# Patient Record
Sex: FEMALE | Race: WHITE | NOT HISPANIC OR LATINO | URBAN - METROPOLITAN AREA
[De-identification: names, ages, dates, MRNs, and addresses within clinical notes are randomized per-mention and may not be internally consistent; named-entity substitution may affect disease eponyms.]

---

## 2024-03-22 ENCOUNTER — OFFICE VISIT (OUTPATIENT)
Dept: URGENT CARE | Age: 34
End: 2024-03-22
Payer: COMMERCIAL

## 2024-03-22 VITALS
OXYGEN SATURATION: 98 % | TEMPERATURE: 97.8 F | DIASTOLIC BLOOD PRESSURE: 68 MMHG | HEART RATE: 73 BPM | RESPIRATION RATE: 18 BRPM | SYSTOLIC BLOOD PRESSURE: 112 MMHG

## 2024-03-22 DIAGNOSIS — L23.7 POISON IVY: Primary | ICD-10-CM

## 2024-03-22 PROCEDURE — S9088 SERVICES PROVIDED IN URGENT: HCPCS

## 2024-03-22 PROCEDURE — 99213 OFFICE O/P EST LOW 20 MIN: CPT

## 2024-03-22 RX ORDER — PREDNISONE 10 MG/1
TABLET ORAL DAILY
Qty: 42 TABLET | Refills: 0 | Status: SHIPPED | OUTPATIENT
Start: 2024-03-22 | End: 2024-04-03

## 2024-03-22 NOTE — PROGRESS NOTES
[facility]        NAME: Rebeka Snell is a 33 y.o. female  : 1990    MRN: [id number]  DATE: 2024  TIME: 12:28 PM      Assessment and Plan     Poison ivy [L23.7]  1. Poison ivy  predniSONE 10 mg tablet            Patient Instructions   Take antibiotic as prescribed. Recommend probiotic use while taking antibiotic.   Benadryl OTC as needed.   Follow-up with PCP in 3-5 days. Go to ER if symptoms worsen.       Chief Complaint     Chief Complaint   Patient presents with    Rash     Michele on back, stomach, face starting a few days ago. Thinks it may be due to poison ivy.         History of Present Illness     Patient is a 33-year-old female who presents with generalized, itchy erythematous rash. Reports she thinks it is poison- reports hx of poison. Denies cough, wheezing, or shortness of breath. Reports history of gestational diabetes that has since resolved. Denies chance of pregnancy. Denies drainage from the rash.     Rash  Pertinent negatives include no cough, fever or shortness of breath.       Review of Systems     Review of Systems   Constitutional:  Negative for fever.   Respiratory:  Negative for cough, shortness of breath and wheezing.    Skin:  Positive for rash.   All other systems reviewed and are negative.        Current Medications       Current Outpatient Medications:     predniSONE 10 mg tablet, Take 6 tablets (60 mg total) by mouth daily for 2 days, THEN 5 tablets (50 mg total) daily for 2 days, THEN 4 tablets (40 mg total) daily for 2 days, THEN 3 tablets (30 mg total) daily for 2 days, THEN 2 tablets (20 mg total) daily for 2 days, THEN 1 tablet (10 mg total) daily for 2 days., Disp: 42 tablet, Rfl: 0    Current Allergies     Allergies as of 2024 - Reviewed 2024   Allergen Reaction Noted    Penicillins Angioedema 2015              The following portions of the patient's history were reviewed and updated as appropriate: allergies, current medications, past  family history, past medical history, past social history, past surgical history and problem list.     No past medical history on file.    No past surgical history on file.    No family history on file.      Medications have been verified.        Objective     /68   Pulse 73   Temp 97.8 °F (36.6 °C)   Resp 18   SpO2 98%   No LMP recorded.         Physical Exam     Physical Exam  Vitals and nursing note reviewed.   Constitutional:       General: She is awake. She is not in acute distress.     Appearance: Normal appearance. She is not ill-appearing, toxic-appearing or diaphoretic.   HENT:      Mouth/Throat:      Lips: Pink.      Mouth: Mucous membranes are moist.      Pharynx: Oropharynx is clear. Uvula midline.   Cardiovascular:      Rate and Rhythm: Normal rate.      Pulses: Normal pulses.      Heart sounds: Normal heart sounds, S1 normal and S2 normal.   Pulmonary:      Effort: Pulmonary effort is normal.      Breath sounds: Normal breath sounds and air entry.   Skin:     General: Skin is warm.      Capillary Refill: Capillary refill takes less than 2 seconds.      Findings: Rash (generalized dried erythematous rash noted to face and upper torso. no drainage. no signs of infection.) present.   Neurological:      Mental Status: She is alert.   Psychiatric:         Mood and Affect: Mood normal.         Behavior: Behavior normal.         Thought Content: Thought content normal.         Judgment: Judgment normal.

## 2024-03-22 NOTE — PATIENT INSTRUCTIONS
Take antibiotic as prescribed. Recommend probiotic use while taking antibiotic.   Benadryl OTC as needed.   Follow-up with PCP in 3-5 days. Go to ER if symptoms worsen.